# Patient Record
Sex: MALE | Race: OTHER | HISPANIC OR LATINO | URBAN - METROPOLITAN AREA
[De-identification: names, ages, dates, MRNs, and addresses within clinical notes are randomized per-mention and may not be internally consistent; named-entity substitution may affect disease eponyms.]

---

## 2018-03-07 PROBLEM — Z00.00 ENCOUNTER FOR PREVENTIVE HEALTH EXAMINATION: Status: ACTIVE | Noted: 2018-03-07

## 2018-03-27 ENCOUNTER — EMERGENCY (EMERGENCY)
Facility: HOSPITAL | Age: 63
LOS: 1 days | Discharge: HOME | End: 2018-03-27
Attending: STUDENT IN AN ORGANIZED HEALTH CARE EDUCATION/TRAINING PROGRAM
Payer: COMMERCIAL

## 2018-03-27 VITALS
HEIGHT: 63 IN | HEART RATE: 77 BPM | TEMPERATURE: 97 F | DIASTOLIC BLOOD PRESSURE: 91 MMHG | SYSTOLIC BLOOD PRESSURE: 171 MMHG | RESPIRATION RATE: 20 BRPM | WEIGHT: 169.98 LBS | OXYGEN SATURATION: 99 %

## 2018-03-27 PROCEDURE — 99284 EMERGENCY DEPT VISIT MOD MDM: CPT

## 2018-03-27 PROCEDURE — 73060 X-RAY EXAM OF HUMERUS: CPT | Mod: 26,RT

## 2018-03-27 PROCEDURE — 73030 X-RAY EXAM OF SHOULDER: CPT | Mod: 26,RT

## 2018-03-27 PROCEDURE — 72040 X-RAY EXAM NECK SPINE 2-3 VW: CPT | Mod: 26

## 2018-03-27 RX ORDER — PROPOFOL 10 MG/ML
150 INJECTION, EMULSION INTRAVENOUS ONCE
Qty: 0 | Refills: 0 | Status: COMPLETED | OUTPATIENT
Start: 2018-03-27 | End: 2018-03-27

## 2018-03-27 RX ORDER — MORPHINE SULFATE 50 MG/1
5 CAPSULE, EXTENDED RELEASE ORAL ONCE
Qty: 0 | Refills: 0 | Status: DISCONTINUED | OUTPATIENT
Start: 2018-03-27 | End: 2018-03-27

## 2018-03-27 RX ORDER — SODIUM CHLORIDE 9 MG/ML
3 INJECTION INTRAMUSCULAR; INTRAVENOUS; SUBCUTANEOUS EVERY 8 HOURS
Qty: 0 | Refills: 0 | Status: DISCONTINUED | OUTPATIENT
Start: 2018-03-27 | End: 2018-04-01

## 2018-03-27 RX ADMIN — MORPHINE SULFATE 5 MILLIGRAM(S): 50 CAPSULE, EXTENDED RELEASE ORAL at 20:40

## 2018-03-27 RX ADMIN — MORPHINE SULFATE 5 MILLIGRAM(S): 50 CAPSULE, EXTENDED RELEASE ORAL at 20:55

## 2018-03-27 NOTE — ED PROVIDER NOTE - PROGRESS NOTE DETAILS
Local injection given for pain control, attempted ROM, despite significant tolerance, unable to reduce shoulder. Will perform procedural sedation and further manipulation to reduce shoulder

## 2018-03-27 NOTE — ED ADULT TRIAGE NOTE - CHIEF COMPLAINT QUOTE
Pt c/o right shoulder pain s/p slip and fall while at work. Pt reports he slipped on oil and landed on his right side. States fall happened at approx 1pm today, waited to get home to call ambulance. Pt reports + LOC. + deformity present to right shoulder. +radial and brachial pulses. Pt c/o right shoulder pain s/p slip and fall while at work. Pt reports he slipped on oil and landed on his right side. States fall happened at approx 1pm today, waited to get home to call ambulance. Pt reports + LOC. + deformity present to right shoulder. +radial and brachial pulses. Denies blood thinner, head injury or trauma.

## 2018-03-27 NOTE — ED PROVIDER NOTE - OBJECTIVE STATEMENT
63 y/o M pt presents to ED c/o R shoulder pain s/p slip and fall on oil spill that occurred at work at 13:00 today. He also notes nausea and dizziness. Pain is worse with movement. NKDA Denies LOC, head injury, CP, SOB, numbness, tingling, fever, chills, and vomiting. No further complaints at this time.

## 2018-03-27 NOTE — ED PROVIDER NOTE - CONSTITUTIONAL, MLM
normal... Well appearing, well nourished, awake, alert, oriented to person, place, time/situation and in no apparent distress at rest.

## 2018-03-28 VITALS
OXYGEN SATURATION: 100 % | SYSTOLIC BLOOD PRESSURE: 145 MMHG | HEART RATE: 75 BPM | DIASTOLIC BLOOD PRESSURE: 88 MMHG | TEMPERATURE: 99 F | RESPIRATION RATE: 18 BRPM

## 2018-03-28 PROCEDURE — T1013: CPT

## 2018-03-28 PROCEDURE — 23650 CLTX SHO DSLC W/MNPJ WO ANES: CPT | Mod: RT

## 2018-03-28 PROCEDURE — 99285 EMERGENCY DEPT VISIT HI MDM: CPT | Mod: 25

## 2018-03-28 PROCEDURE — 73030 X-RAY EXAM OF SHOULDER: CPT | Mod: 26,RT

## 2018-03-28 PROCEDURE — 73030 X-RAY EXAM OF SHOULDER: CPT

## 2018-03-28 PROCEDURE — 96372 THER/PROPH/DIAG INJ SC/IM: CPT | Mod: XU

## 2018-03-28 PROCEDURE — 96374 THER/PROPH/DIAG INJ IV PUSH: CPT | Mod: XU

## 2018-03-28 PROCEDURE — 73060 X-RAY EXAM OF HUMERUS: CPT

## 2018-03-28 PROCEDURE — 72040 X-RAY EXAM NECK SPINE 2-3 VW: CPT

## 2018-03-28 RX ADMIN — SODIUM CHLORIDE 3 MILLILITER(S): 9 INJECTION INTRAMUSCULAR; INTRAVENOUS; SUBCUTANEOUS at 01:02

## 2018-03-28 RX ADMIN — PROPOFOL 150 MILLIGRAM(S): 10 INJECTION, EMULSION INTRAVENOUS at 00:13

## 2018-03-28 NOTE — ED ADULT NURSE REASSESSMENT NOTE - NS ED NURSE REASSESS COMMENT FT1
Pt. shoulder reduced with orthopedic BRIAN Pagan and Md Gould @ 0016. X-ray to confirm. Total of 70mg Propofol pushed by MD Gould. vital signs remain stable. will continue to monitor and maintain safety.

## 2018-03-28 NOTE — CONSULT NOTE ADULT - SUBJECTIVE AND OBJECTIVE BOX
Pt Name: ELIZABETH GRANT    MRN: [id number]      Patient is a 62y Male presenting to the emergency department with a chief complaint of right shoulder pain   Patient is a 62y old  Male who presents with a chief complaint of right shoulder pain.  Patient states slipped and fell at work around 1 pm landing on shoulder. patient was seen by ER staff found to have right shoulder dislocation which ER was unable to reduce.  Orthopedic consult called    HEALTH ISSUES - PROBLEM Dx: Right shoulder dislocation       .      REVIEW OF SYSTEMS      General:	    Skin/Breast:  	  Ophthalmologic:  	  ENMT:	    Respiratory and Thorax:  	  Cardiovascular:	    Gastrointestinal:	    Genitourinary:	    Musculoskeletal:	 right shoulder pain     Neurological:	    Psychiatric:	    Hematology/Lymphatics:	    Endocrine:	    Allergic/Immunologic:	    ROS is otherwise negative.    PAST MEDICAL & SURGICAL HISTORY:  PAST MEDICAL & SURGICAL HISTORY:      Allergies: No Known Allergies      Medications: propofol Injectable 150 milliGRAM(s) IV Push once  sodium chloride 0.9% lock flush 3 milliLiter(s) IV Push every 8 hours      FAMILY HISTORY:  : non-contributory    Social History: denies drug use     Ambulation: Walking independently              PHYSICAL EXAM:    Vital Signs Last 24 Hrs  T(C): 37.2 (27 Mar 2018 23:41), Max: 37.2 (27 Mar 2018 23:41)  T(F): 98.9 (27 Mar 2018 23:41), Max: 98.9 (27 Mar 2018 23:41)  HR: 78 (27 Mar 2018 23:41) (77 - 78)  BP: 136/78 (27 Mar 2018 23:41) (136/78 - 171/91)  BP(mean): --  RR: 18 (27 Mar 2018 23:41) (18 - 20)  SpO2: 97% (27 Mar 2018 23:41) (97% - 99%)  Daily Height in cm: 160.02 (27 Mar 2018 18:02)    Daily     Appearance: Alert, responsive, in no acute distress.    Neurological: Sensation is grossly intact to light touch. 5/5 motor function of all extremities. No focal deficits or weaknesses found.    Skin: no rash on visible skin. Skin is clean, dry and intact. No bleeding. No abrasions. No ulcerations.    Vascular: 2+ distal pulses. Cap refill < 2 sec. No signs of venous insuffiency or stasis. No extremity ulcerations. No cyanosis.    Musculoskeletal:         Right Upper Extremity: positive deformity to right shoulder.  positive pain on passive ROM, unable to actively range due to pain. sensation to arm intact to light touch, pulse intact     Imaging Studies: right shoulder xray - positive dislocation     JOINT REDUCTION  PROCEDURE NOTE: Joint reduction     Performed by: Jordon Pagan PA-C    Indication: Dislocation of Right shoulder dislocation     Consent: The risks and benefits of the procedure including incomplete reduction, secondary fracture, nerve damage and bleeding were explained and the patient verbalized their understanding and wished to proceed with the procedure. Written consent was obtained following the discussion.    Universal Protocol: a time out was performed and the correct patient and site were verified     Procedure: Neurovascular exam intact prior to joint reduction.  Skin exam: no bleeding or lacerations at the fracture site. Conscious sedation performed by emergency department attending physician. Anesthesia was administered using sedation by ER attending Reduction of the Right Shoulder Dislcoation was accomplished via traction.  The joint was immobilized with sling. Distally, the extremity was neurovascular intact following the procedure.  The patient tolerated the procedure well.    Post reduction films obtained and demonstrated an adequate reduction.    Complications: None      Case discussed with Dr. Escobar    A/P:  Pt is a  62y old  Male who presents with a chief complaint of right shoulder pain s/p fall found to have right shoulder dislocation     PLAN:   1. NWB RUE   2. Keep sling in place  3. Follow up in office this week

## 2018-03-28 NOTE — ED ADULT NURSE NOTE - CHIEF COMPLAINT QUOTE
Pt c/o right shoulder pain s/p slip and fall while at work. Pt reports he slipped on oil and landed on his right side. States fall happened at approx 1pm today, waited to get home to call ambulance. Pt reports + LOC. + deformity present to right shoulder. +radial and brachial pulses. Denies blood thinner, head injury or trauma.

## 2018-03-28 NOTE — ED ADULT NURSE NOTE - OBJECTIVE STATEMENT
Pt. presents to ED s/p slip and fall at work. Pt. denies hitting head or LOC, c/o right shoulder pain with slight deformity noted. Pt. has full sensation to right upper extremity. no other complaints at this time.

## 2018-03-28 NOTE — ED ADULT NURSE REASSESSMENT NOTE - NS ED NURSE REASSESS COMMENT FT1
Time out performed for conscious sedation and right shoulder seduction. Py. VSS, necessary equipment at bedside.

## 2018-03-28 NOTE — ED PROCEDURE NOTE - NS_POSTPROCCAREGUIDE_ED_ALL_ED
Patient is now fully awake, with vital signs and temperature stable, hydration is adequate, patients Justus’s  score is at baseline (or greater than 8), patient and escort has received  discharge education.

## 2018-04-05 ENCOUNTER — APPOINTMENT (OUTPATIENT)
Dept: GASTROENTEROLOGY | Facility: CLINIC | Age: 63
End: 2018-04-05

## 2021-01-05 ENCOUNTER — EMERGENCY (EMERGENCY)
Facility: HOSPITAL | Age: 66
LOS: 1 days | Discharge: HOME | End: 2021-01-05
Attending: EMERGENCY MEDICINE
Payer: MEDICARE

## 2021-01-05 VITALS
RESPIRATION RATE: 18 BRPM | HEIGHT: 63 IN | SYSTOLIC BLOOD PRESSURE: 139 MMHG | DIASTOLIC BLOOD PRESSURE: 85 MMHG | HEART RATE: 64 BPM | OXYGEN SATURATION: 99 % | TEMPERATURE: 99 F | WEIGHT: 179.9 LBS

## 2021-01-05 PROCEDURE — 99053 MED SERV 10PM-8AM 24 HR FAC: CPT

## 2021-01-05 PROCEDURE — 99285 EMERGENCY DEPT VISIT HI MDM: CPT

## 2021-01-06 LAB
ALBUMIN SERPL ELPH-MCNC: 4.2 G/DL — SIGNIFICANT CHANGE UP (ref 3.3–5.2)
ALP SERPL-CCNC: 108 U/L — SIGNIFICANT CHANGE UP (ref 40–120)
ALT FLD-CCNC: 14 U/L — SIGNIFICANT CHANGE UP
ANION GAP SERPL CALC-SCNC: 11 MMOL/L — SIGNIFICANT CHANGE UP (ref 5–17)
APPEARANCE UR: CLEAR — SIGNIFICANT CHANGE UP
APTT BLD: 34.2 SEC — SIGNIFICANT CHANGE UP (ref 27.5–35.5)
AST SERPL-CCNC: 17 U/L — SIGNIFICANT CHANGE UP
BACTERIA # UR AUTO: ABNORMAL
BASOPHILS # BLD AUTO: 0.04 K/UL — SIGNIFICANT CHANGE UP (ref 0–0.2)
BASOPHILS NFR BLD AUTO: 0.4 % — SIGNIFICANT CHANGE UP (ref 0–2)
BILIRUB SERPL-MCNC: 0.2 MG/DL — LOW (ref 0.4–2)
BILIRUB UR-MCNC: NEGATIVE — SIGNIFICANT CHANGE UP
BLD GP AB SCN SERPL QL: SIGNIFICANT CHANGE UP
BUN SERPL-MCNC: 16 MG/DL — SIGNIFICANT CHANGE UP (ref 8–20)
CALCIUM SERPL-MCNC: 9.3 MG/DL — SIGNIFICANT CHANGE UP (ref 8.6–10.2)
CHLORIDE SERPL-SCNC: 104 MMOL/L — SIGNIFICANT CHANGE UP (ref 98–107)
CO2 SERPL-SCNC: 24 MMOL/L — SIGNIFICANT CHANGE UP (ref 22–29)
COLOR SPEC: YELLOW — SIGNIFICANT CHANGE UP
COMMENT - URINE: SIGNIFICANT CHANGE UP
CREAT SERPL-MCNC: 0.74 MG/DL — SIGNIFICANT CHANGE UP (ref 0.5–1.3)
DIFF PNL FLD: ABNORMAL
EOSINOPHIL # BLD AUTO: 0.11 K/UL — SIGNIFICANT CHANGE UP (ref 0–0.5)
EOSINOPHIL NFR BLD AUTO: 1.1 % — SIGNIFICANT CHANGE UP (ref 0–6)
EPI CELLS # UR: SIGNIFICANT CHANGE UP
GLUCOSE SERPL-MCNC: 101 MG/DL — HIGH (ref 70–99)
GLUCOSE UR QL: NEGATIVE MG/DL — SIGNIFICANT CHANGE UP
HCT VFR BLD CALC: 43.1 % — SIGNIFICANT CHANGE UP (ref 39–50)
HGB BLD-MCNC: 14.4 G/DL — SIGNIFICANT CHANGE UP (ref 13–17)
IMM GRANULOCYTES NFR BLD AUTO: 0.5 % — SIGNIFICANT CHANGE UP (ref 0–1.5)
INR BLD: 1.15 RATIO — SIGNIFICANT CHANGE UP (ref 0.88–1.16)
KETONES UR-MCNC: NEGATIVE — SIGNIFICANT CHANGE UP
LACTATE BLDV-MCNC: 1 MMOL/L — SIGNIFICANT CHANGE UP (ref 0.5–2)
LEUKOCYTE ESTERASE UR-ACNC: NEGATIVE — SIGNIFICANT CHANGE UP
LYMPHOCYTES # BLD AUTO: 2.79 K/UL — SIGNIFICANT CHANGE UP (ref 1–3.3)
LYMPHOCYTES # BLD AUTO: 28.9 % — SIGNIFICANT CHANGE UP (ref 13–44)
MCHC RBC-ENTMCNC: 32 PG — SIGNIFICANT CHANGE UP (ref 27–34)
MCHC RBC-ENTMCNC: 33.4 GM/DL — SIGNIFICANT CHANGE UP (ref 32–36)
MCV RBC AUTO: 95.8 FL — SIGNIFICANT CHANGE UP (ref 80–100)
MONOCYTES # BLD AUTO: 0.86 K/UL — SIGNIFICANT CHANGE UP (ref 0–0.9)
MONOCYTES NFR BLD AUTO: 8.9 % — SIGNIFICANT CHANGE UP (ref 2–14)
NEUTROPHILS # BLD AUTO: 5.79 K/UL — SIGNIFICANT CHANGE UP (ref 1.8–7.4)
NEUTROPHILS NFR BLD AUTO: 60.2 % — SIGNIFICANT CHANGE UP (ref 43–77)
NITRITE UR-MCNC: NEGATIVE — SIGNIFICANT CHANGE UP
PH UR: 6 — SIGNIFICANT CHANGE UP (ref 5–8)
PLATELET # BLD AUTO: 232 K/UL — SIGNIFICANT CHANGE UP (ref 150–400)
POTASSIUM SERPL-MCNC: 4.3 MMOL/L — SIGNIFICANT CHANGE UP (ref 3.5–5.3)
POTASSIUM SERPL-SCNC: 4.3 MMOL/L — SIGNIFICANT CHANGE UP (ref 3.5–5.3)
PROT SERPL-MCNC: 7.1 G/DL — SIGNIFICANT CHANGE UP (ref 6.6–8.7)
PROT UR-MCNC: 15 MG/DL
PROTHROM AB SERPL-ACNC: 13.2 SEC — SIGNIFICANT CHANGE UP (ref 10.6–13.6)
RBC # BLD: 4.5 M/UL — SIGNIFICANT CHANGE UP (ref 4.2–5.8)
RBC # FLD: 12.9 % — SIGNIFICANT CHANGE UP (ref 10.3–14.5)
RBC CASTS # UR COMP ASSIST: SIGNIFICANT CHANGE UP /HPF (ref 0–4)
SODIUM SERPL-SCNC: 138 MMOL/L — SIGNIFICANT CHANGE UP (ref 135–145)
SP GR SPEC: 1.02 — SIGNIFICANT CHANGE UP (ref 1.01–1.02)
UROBILINOGEN FLD QL: NEGATIVE MG/DL — SIGNIFICANT CHANGE UP
WBC # BLD: 9.64 K/UL — SIGNIFICANT CHANGE UP (ref 3.8–10.5)
WBC # FLD AUTO: 9.64 K/UL — SIGNIFICANT CHANGE UP (ref 3.8–10.5)
WBC UR QL: SIGNIFICANT CHANGE UP

## 2021-01-06 PROCEDURE — 85025 COMPLETE CBC W/AUTO DIFF WBC: CPT

## 2021-01-06 PROCEDURE — 36415 COLL VENOUS BLD VENIPUNCTURE: CPT

## 2021-01-06 PROCEDURE — 99284 EMERGENCY DEPT VISIT MOD MDM: CPT | Mod: 25

## 2021-01-06 PROCEDURE — 85730 THROMBOPLASTIN TIME PARTIAL: CPT

## 2021-01-06 PROCEDURE — 74177 CT ABD & PELVIS W/CONTRAST: CPT | Mod: 26

## 2021-01-06 PROCEDURE — 86900 BLOOD TYPING SEROLOGIC ABO: CPT

## 2021-01-06 PROCEDURE — 85610 PROTHROMBIN TIME: CPT

## 2021-01-06 PROCEDURE — 86850 RBC ANTIBODY SCREEN: CPT

## 2021-01-06 PROCEDURE — 80053 COMPREHEN METABOLIC PANEL: CPT

## 2021-01-06 PROCEDURE — 81001 URINALYSIS AUTO W/SCOPE: CPT

## 2021-01-06 PROCEDURE — 96374 THER/PROPH/DIAG INJ IV PUSH: CPT | Mod: XU

## 2021-01-06 PROCEDURE — 86901 BLOOD TYPING SEROLOGIC RH(D): CPT

## 2021-01-06 PROCEDURE — 74177 CT ABD & PELVIS W/CONTRAST: CPT

## 2021-01-06 PROCEDURE — 83605 ASSAY OF LACTIC ACID: CPT

## 2021-01-06 RX ORDER — MORPHINE SULFATE 50 MG/1
6 CAPSULE, EXTENDED RELEASE ORAL ONCE
Refills: 0 | Status: DISCONTINUED | OUTPATIENT
Start: 2021-01-06 | End: 2021-01-06

## 2021-01-06 RX ADMIN — MORPHINE SULFATE 6 MILLIGRAM(S): 50 CAPSULE, EXTENDED RELEASE ORAL at 03:55

## 2021-01-06 NOTE — ED PROVIDER NOTE - PROGRESS NOTE DETAILS
surgery consulted: were able to reduce the hernia. recommend the patient may be d/c and follow up with surgery outpatient.

## 2021-01-06 NOTE — CONSULT NOTE ADULT - ATTENDING COMMENTS
Pt with reducible inguinal hernia with relief of discomfort with reduction  Pt to follow up in office for planning for elective hernia repair   Case discussed with on-call surgery resident

## 2021-01-06 NOTE — ED PROVIDER NOTE - OBJECTIVE STATEMENT
65 year old male with no pmhx presents with c/o hernia. Pt states he has had this hernia for 2 years, he has never seen a doctor for it previously. He states in the past the hernia migrates into the scrotum during BM's, urination and increased pressure but freely reduces after. For the last week he states it is non-reducible and he has increased pain. He has not been taking medications for the pain. He denies n/v/d, constipation, loss of appetite, fever/chills.

## 2021-01-06 NOTE — ED PROVIDER NOTE - ATTENDING CONTRIBUTION TO CARE
I personally saw the patient with the PA, and completed the key components of the history and physical exam. I then discussed the management plan with the PA.   gen in mild pain resp clear cardiac no mrumur abd soft nt gu + right inguinal ttp hernia sac non reducible no left testicle swelling   surgery recs implemented

## 2021-01-06 NOTE — ED PROVIDER NOTE - NSFOLLOWUPINSTRUCTIONS_ED_ALL_ED_FT
Follow up with Surgery within the next 2 days   Take Tylenol as needed for pain    Return to ER if symptoms return, persists, intractable pain, n/v, fever/chills.       Hernia    A hernia is when fat or the intestines push through a weak area in the abdominal wall. This can occur around the belly button (umbilical hernia) or where the leg meets the lower abdomen (inguinal hernia). This creates a rounded lump (bulge). Hernias that can be pushed back into the belly are called reducible and those that cannot are called incarcerated. Hernias that are not reducible and lose their blood supply are called strangulated and require emergency surgery. Hernias can be caused or worsened when straining during bowel movements or lifting heavy objects as well as coughing or sneezing. Surgery may be helpful in treating this condition so follow up with a surgeon.    SEEK IMMEDIATE MEDICAL CARE IF YOU HAVE ANY OF THE FOLLOWING SYMPTOMS: worsening abdominal pain, change in color over the hernia, nausea/vomiting, or inability to have a bowel movement or pass gas.

## 2021-01-06 NOTE — ED PROVIDER NOTE - NSFOLLOWUPCLINICS_GEN_ALL_ED_FT
[facility] General Surgery  Surgery  [address] [place] [address]  Phone: [phone]  Fax:   Follow Up Time:

## 2021-01-06 NOTE — CONSULT NOTE ADULT - SUBJECTIVE AND OBJECTIVE BOX
ACUTE CARE SURGERY CONSULT    HPI: Mr. Omer is a 65M no known PMH, presenting with 1-day of constant righ groin pain. Patient has a know history of a right inguinal hernia and describes similar pain 2 years ago that resolved with OTC pain medications. No previous evaluations in the past. He states that the pain is associated with difficulty urinating. Denies changes in bowel habits or food intolerance. Last BM yesterday was normal. Last meal wss 5pm yesterday. No nausea or vomiting. Denies skin changes but endorses increased swelling of the scrotum. No fevers, CP, SOB.       PAST MEDICAL HISTORY:  No pertinent past medical history        PAST SURGICAL HISTORY:  No significant past surgical history        ALLERGIES:  No Known Allergies      FAMILY HISTORY: Noncontributory    SOCIAL HISTORY: Denies tobacco, EtOH, illicit substance use.     HOME MEDICATIONS:    MEDICATIONS  (STANDING):  morphine  - Injectable 6 milliGRAM(s) IV Push Once    MEDICATIONS  (PRN):      VITALS & I/Os:  Vital Signs Last 24 Hrs  T(C): 37.3 (2021 23:41), Max: 37.3 (2021 23:41)  T(F): 99.2 (2021 23:41), Max: 99.2 (2021 23:41)  HR: 64 (2021 23:41) (64 - 64)  BP: 139/85 (2021 23:41) (139/85 - 139/85)  BP(mean): --  RR: 18 (2021 23:41) (18 - 18)  SpO2: 99% (2021 23:41) (99% - 99%)  CAPILLARY BLOOD GLUCOSE          I&O's Summary      GENERAL: Alert, well developed, in no acute distress.  MENTAL STATUS: AAOx3. Appropriate affect.  RESPIRATORY: CTAB. No wheezing, rales or rhonchi.  CARDIOVASCULAR: RRR. No audible murmurs, rubs or gallops.   GASTROINTESTINAL: Abdomen soft, NT, ND, -R/-G,   GROIN: Palpable approx. 2cm defect in right inguinal region, unable to fully reduce hernia contents     LABS:                        14.4   9.64  )-----------( 232      ( 2021 01:13 )             43.1     01-06    138  |  104  |  16.0  ----------------------------<  101<H>  4.3   |  24.0  |  0.74    Ca    9.3      2021 01:13    TPro  7.1  /  Alb  4.2  /  TBili  0.2<L>  /  DBili  x   /  AST  17  /  ALT  14  /  AlkPhos  108      Lactate: morphine  - Injectable 6 milliGRAM(s) IV Push Once     PT/INR - ( 2021 01:13 )   PT: 13.2 sec;   INR: 1.15 ratio         PTT - ( 2021 01:13 )  PTT:34.2 sec         @ 01:11  1.0    Urinalysis Basic - ( 2021 01:22 )    Color: Yellow / Appearance: Clear / S.020 / pH: x  Gluc: x / Ketone: Negative  / Bili: Negative / Urobili: Negative mg/dL   Blood: x / Protein: 15 mg/dL / Nitrite: Negative   Leuk Esterase: Negative / RBC: 0-2 /HPF / WBC 0-2   Sq Epi: x / Non Sq Epi: Occasional / Bacteria: Occasional        IMAGING:  CT A/P: Mildly prominent fecalized distal jejunal and ileal loops, concerning for ileus vs at least partial small bowel obstruction.  Transition point suspected at the level of the right groin hernia containing portion of the distal jejunal fold with trace mesenteric haziness. Recommend clinical correlation to assess for strangulated or incarcerated hernia though no pneumatosis or portal venous gas identified. Correlate with lactic acid.    Portion of the bladder also herniates within the right groin hernia extending tothe scrotal sac.   ACUTE CARE SURGERY CONSULT    HPI: Mr. Omer is a 65M no known PMH, presenting with 1-day of constant righ groin pain. Patient has a know history of a right inguinal hernia and describes similar pain 2 years ago that resolved with OTC pain medications. No previous evaluations in the past. He states that the pain is associated with difficulty urinating. Denies changes in bowel habits or food intolerance. Last BM yesterday was normal. Last meal wss 5pm yesterday. No nausea or vomiting. Denies skin changes but endorses increased swelling of the scrotum. No fevers, CP, SOB.       PAST MEDICAL HISTORY:  No pertinent past medical history        PAST SURGICAL HISTORY:  No significant past surgical history        ALLERGIES:  No Known Allergies      FAMILY HISTORY: Noncontributory    SOCIAL HISTORY: Denies tobacco, EtOH, illicit substance use.     HOME MEDICATIONS:    MEDICATIONS  (STANDING):  morphine  - Injectable 6 milliGRAM(s) IV Push Once    MEDICATIONS  (PRN):      VITALS & I/Os:  Vital Signs Last 24 Hrs  T(C): 37.3 (2021 23:41), Max: 37.3 (2021 23:41)  T(F): 99.2 (2021 23:41), Max: 99.2 (2021 23:41)  HR: 64 (2021 23:41) (64 - 64)  BP: 139/85 (2021 23:41) (139/85 - 139/85)  BP(mean): --  RR: 18 (2021 23:41) (18 - 18)  SpO2: 99% (2021 23:41) (99% - 99%)  CAPILLARY BLOOD GLUCOSE          I&O's Summary      GENERAL: Alert, well developed, in no acute distress.  MENTAL STATUS: AAOx3. Appropriate affect.  RESPIRATORY: CTAB. No wheezing, rales or rhonchi.  CARDIOVASCULAR: RRR. No audible murmurs, rubs or gallops.   GASTROINTESTINAL: Abdomen soft, NT, ND, -R/-G,   GROIN: Palpable approx. 2cm defect in right inguinal region, unable to fully reduce hernia contents, no overlying scrotal or inguinal skin changes      LABS:                        14.4   9.64  )-----------( 232      ( 2021 01:13 )             43.1     01-06    138  |  104  |  16.0  ----------------------------<  101<H>  4.3   |  24.0  |  0.74    Ca    9.3      2021 01:13    TPro  7.1  /  Alb  4.2  /  TBili  0.2<L>  /  DBili  x   /  AST  17  /  ALT  14  /  AlkPhos  108      Lactate: morphine  - Injectable 6 milliGRAM(s) IV Push Once     PT/INR - ( 2021 01:13 )   PT: 13.2 sec;   INR: 1.15 ratio         PTT - ( 2021 01:13 )  PTT:34.2 sec         @ 01:11  1.0    Urinalysis Basic - ( 2021 01:22 )    Color: Yellow / Appearance: Clear / S.020 / pH: x  Gluc: x / Ketone: Negative  / Bili: Negative / Urobili: Negative mg/dL   Blood: x / Protein: 15 mg/dL / Nitrite: Negative   Leuk Esterase: Negative / RBC: 0-2 /HPF / WBC 0-2   Sq Epi: x / Non Sq Epi: Occasional / Bacteria: Occasional        IMAGING:  CT A/P: Mildly prominent fecalized distal jejunal and ileal loops, concerning for ileus vs at least partial small bowel obstruction.  Transition point suspected at the level of the right groin hernia containing portion of the distal jejunal fold with trace mesenteric haziness. Recommend clinical correlation to assess for strangulated or incarcerated hernia though no pneumatosis or portal venous gas identified. Correlate with lactic acid.    Portion of the bladder also herniates within the right groin hernia extending tothe scrotal sac.   ACUTE CARE SURGERY CONSULT    HPI: Mr. Omer is a 65M no known PMH, presenting with 1-day of constant righ groin pain. Patient has a know history of a right inguinal hernia and describes similar pain 2 years ago that resolved with OTC pain medications. No previous evaluations in the past. He states that the pain is associated with difficulty urinating. Denies changes in bowel habits or food intolerance. Last BM yesterday was normal. Last meal wss 5pm yesterday. No nausea or vomiting. Denies skin changes but endorses increased swelling of the scrotum. No fevers, CP, SOB.       PAST MEDICAL HISTORY:  No pertinent past medical history        PAST SURGICAL HISTORY:  No significant past surgical history        ALLERGIES:  No Known Allergies      FAMILY HISTORY: Noncontributory    SOCIAL HISTORY: Denies tobacco, EtOH, illicit substance use.     HOME MEDICATIONS:    MEDICATIONS  (STANDING):  morphine  - Injectable 6 milliGRAM(s) IV Push Once    MEDICATIONS  (PRN):      VITALS & I/Os:  Vital Signs Last 24 Hrs  T(C): 37.3 (2021 23:41), Max: 37.3 (2021 23:41)  T(F): 99.2 (2021 23:41), Max: 99.2 (2021 23:41)  HR: 64 (2021 23:41) (64 - 64)  BP: 139/85 (2021 23:41) (139/85 - 139/85)  BP(mean): --  RR: 18 (2021 23:41) (18 - 18)  SpO2: 99% (2021 23:41) (99% - 99%)  CAPILLARY BLOOD GLUCOSE          I&O's Summary      GENERAL: Alert, well developed, in no acute distress.  MENTAL STATUS: AAOx3. Appropriate affect.  RESPIRATORY: CTAB. No wheezing, rales or rhonchi.  CARDIOVASCULAR: RRR. No audible murmurs, rubs or gallops.   GASTROINTESTINAL: Abdomen soft, NT, ND, -R/-G,   GROIN: Palpable large right inguinal defect, fully reducible soft hernia contents, no overlying scrotal or inguinal skin changes      LABS:                        14.4   9.64  )-----------( 232      ( 2021 01:13 )             43.1     01-06    138  |  104  |  16.0  ----------------------------<  101<H>  4.3   |  24.0  |  0.74    Ca    9.3      2021 01:13    TPro  7.1  /  Alb  4.2  /  TBili  0.2<L>  /  DBili  x   /  AST  17  /  ALT  14  /  AlkPhos  108      Lactate: morphine  - Injectable 6 milliGRAM(s) IV Push Once     PT/INR - ( 2021 01:13 )   PT: 13.2 sec;   INR: 1.15 ratio         PTT - ( 2021 01:13 )  PTT:34.2 sec         @ 01:11  1.0    Urinalysis Basic - ( 2021 01:22 )    Color: Yellow / Appearance: Clear / S.020 / pH: x  Gluc: x / Ketone: Negative  / Bili: Negative / Urobili: Negative mg/dL   Blood: x / Protein: 15 mg/dL / Nitrite: Negative   Leuk Esterase: Negative / RBC: 0-2 /HPF / WBC 0-2   Sq Epi: x / Non Sq Epi: Occasional / Bacteria: Occasional        IMAGING:  CT A/P: Mildly prominent fecalized distal jejunal and ileal loops, concerning for ileus vs at least partial small bowel obstruction.  Transition point suspected at the level of the right groin hernia containing portion of the distal jejunal fold with trace mesenteric haziness. Recommend clinical correlation to assess for strangulated or incarcerated hernia though no pneumatosis or portal venous gas identified. Correlate with lactic acid.    Portion of the bladder also herniates within the right groin hernia extending tothe scrotal sac.

## 2021-01-06 NOTE — ED ADULT NURSE NOTE - OBJECTIVE STATEMENT
pt arrived c/o generalized abdominal pain. states its from his hernia but worse lately. denies nausea, vomiting, diarrhea. pt aao x3. respirations even and unlabored. skin color wnl warm and dry. no distress noted.

## 2021-01-06 NOTE — CONSULT NOTE ADULT - ASSESSMENT
65M, known history of a right inguinal hernia, presenting with an incarcerated right inguinal hernia containing a portion of the bladder and loops of jejunum. Lactate 1.0. No signs of bowel ischemia. AVSS.    -Maintain NPO/IVF  -Pain control   -Will discuss with attending  65M, presenting with a right inguinal hernia. Fully reducible at bedside. No evidence of strangulation or clinical concern for obstruction.   Lactate 1.0. AVSS.    -Pain control  -Avoid heavy lifting of greater than 10-15lbs   -Patient advised to return to ED if febrile, scrotal skin changes, nausea, vomiting, or food intolerance   -Patient can follow-up in [facility] clinic in 2-weeks to discuss elective repair of right inguinal hernia

## 2021-01-06 NOTE — ED PROVIDER NOTE - PATIENT PORTAL LINK FT
You can access the FollowMyHealth Patient Portal offered by [facility] by registering at the following website: [email or web address]. By joining [facility]’s FollowMyHealth portal, you will also be able to view your health information using other applications (apps) compatible with our system.

## 2021-01-08 ENCOUNTER — TRANSCRIPTION ENCOUNTER (OUTPATIENT)
Age: 66
End: 2021-01-08

## 2021-01-08 ENCOUNTER — INPATIENT (INPATIENT)
Facility: HOSPITAL | Age: 66
LOS: 0 days | Discharge: HOME | DRG: 352 | End: 2021-01-09
Attending: SURGERY | Admitting: SURGERY
Payer: MEDICARE

## 2021-01-08 VITALS
RESPIRATION RATE: 18 BRPM | TEMPERATURE: 98 F | DIASTOLIC BLOOD PRESSURE: 82 MMHG | OXYGEN SATURATION: 99 % | HEART RATE: 70 BPM | SYSTOLIC BLOOD PRESSURE: 157 MMHG | WEIGHT: 164.91 LBS | HEIGHT: 63 IN

## 2021-01-08 LAB
BASOPHILS # BLD AUTO: 0.05 K/UL — SIGNIFICANT CHANGE UP (ref 0–0.2)
BASOPHILS NFR BLD AUTO: 0.5 % — SIGNIFICANT CHANGE UP (ref 0–2)
EOSINOPHIL # BLD AUTO: 0.11 K/UL — SIGNIFICANT CHANGE UP (ref 0–0.5)
EOSINOPHIL NFR BLD AUTO: 1.1 % — SIGNIFICANT CHANGE UP (ref 0–6)
HCT VFR BLD CALC: 44.1 % — SIGNIFICANT CHANGE UP (ref 39–50)
HGB BLD-MCNC: 14.8 G/DL — SIGNIFICANT CHANGE UP (ref 13–17)
IMM GRANULOCYTES NFR BLD AUTO: 0.4 % — SIGNIFICANT CHANGE UP (ref 0–1.5)
LACTATE BLDV-MCNC: 1.1 MMOL/L — SIGNIFICANT CHANGE UP (ref 0.5–2)
LYMPHOCYTES # BLD AUTO: 4.5 K/UL — HIGH (ref 1–3.3)
LYMPHOCYTES # BLD AUTO: 43.3 % — SIGNIFICANT CHANGE UP (ref 13–44)
MCHC RBC-ENTMCNC: 32 PG — SIGNIFICANT CHANGE UP (ref 27–34)
MCHC RBC-ENTMCNC: 33.6 GM/DL — SIGNIFICANT CHANGE UP (ref 32–36)
MCV RBC AUTO: 95.5 FL — SIGNIFICANT CHANGE UP (ref 80–100)
MONOCYTES # BLD AUTO: 0.93 K/UL — HIGH (ref 0–0.9)
MONOCYTES NFR BLD AUTO: 9 % — SIGNIFICANT CHANGE UP (ref 2–14)
NEUTROPHILS # BLD AUTO: 4.76 K/UL — SIGNIFICANT CHANGE UP (ref 1.8–7.4)
NEUTROPHILS NFR BLD AUTO: 45.7 % — SIGNIFICANT CHANGE UP (ref 43–77)
PLATELET # BLD AUTO: 232 K/UL — SIGNIFICANT CHANGE UP (ref 150–400)
RBC # BLD: 4.62 M/UL — SIGNIFICANT CHANGE UP (ref 4.2–5.8)
RBC # FLD: 12.6 % — SIGNIFICANT CHANGE UP (ref 10.3–14.5)
WBC # BLD: 10.39 K/UL — SIGNIFICANT CHANGE UP (ref 3.8–10.5)
WBC # FLD AUTO: 10.39 K/UL — SIGNIFICANT CHANGE UP (ref 3.8–10.5)

## 2021-01-08 PROCEDURE — 99053 MED SERV 10PM-8AM 24 HR FAC: CPT

## 2021-01-08 PROCEDURE — 99285 EMERGENCY DEPT VISIT HI MDM: CPT

## 2021-01-08 RX ORDER — MORPHINE SULFATE 50 MG/1
4 CAPSULE, EXTENDED RELEASE ORAL ONCE
Refills: 0 | Status: DISCONTINUED | OUTPATIENT
Start: 2021-01-08 | End: 2021-01-08

## 2021-01-08 RX ADMIN — MORPHINE SULFATE 4 MILLIGRAM(S): 50 CAPSULE, EXTENDED RELEASE ORAL at 23:44

## 2021-01-08 NOTE — ED PROVIDER NOTE - PHYSICAL EXAMINATION
Gen: WD/WN, uncomfortable appearing  Head: NCAT  Cardiac: RRR +S1S2.   Resp: Speaking in full sentences. No evidence of respiratory distress. No wheezes, rales or rhonchi.  Abd: +BS x 4. Soft, non-tender, non-distended. No guarding or rebound.  : +Very large palpable right inguinal hernia, no overlying skin changes.  Back: Spine midline and non-tender. No CVAT.  Skin: Warm, dry, no rashes or lesions  Neuro: Awake, alert & oriented x 3. No focal deficits, ambulatory with steady gait     Chaperone ED  Luis Reyes

## 2021-01-08 NOTE — ED PROVIDER NOTE - ATTENDING CONTRIBUTION TO CARE
66 yo M with recurrent large R inguinal hernia.  Pt seen in ED 4 days ago and hernia was reduced and pt was subsequently dc'd.  No assoc fever ot vomiting.  On exam pt awake and alert, appears uncomfortable, + large nonreducible inguinal hernia.  Pt seen and evaluated by Surgery and pt to be admitted to s/o Dr. Almaguer

## 2021-01-08 NOTE — ED ADULT NURSE NOTE - NSIMPLEMENTINTERV_GEN_ALL_ED
Implemented All Universal Safety Interventions:  [place] to call system. Call bell, personal items and telephone within reach. Instruct patient to call for assistance. Room bathroom lighting operational. Non-slip footwear when patient is off stretcher. Physically safe environment: no spills, clutter or unnecessary equipment. Stretcher in lowest position, wheels locked, appropriate side rails in place.

## 2021-01-08 NOTE — ED PROVIDER NOTE - CLINICAL SUMMARY MEDICAL DECISION MAKING FREE TEXT BOX
66yo M with large right inguinal hernia. No fever, no overlying skin changes. Labs, pain meds, surgery consult

## 2021-01-08 NOTE — ED PROVIDER NOTE - OBJECTIVE STATEMENT
66yo M pmhx inguinal hernia presents to ED c/o hernia. Pt was seen in ED 3 days ago for right inguinal hernia, surgery service was able to reduce and pt was discharged to f/u as outpt. States area remained "okay" for 1 day but has since become worse, states hernia is protruding again and worse than it was 3 days ago. Noting pain to area and pressure while trying to urinate. No burning with urination. Denies fever, skin changes, erythema, dysuria, n/v.

## 2021-01-08 NOTE — ED ADULT NURSE NOTE - OBJECTIVE STATEMENT
Pt presents to ED for inguinal hernia, was here several days ago with a hernia which was reduced but has become worse since Tuesday. Pt in obvious discomfort and hernia is visible even with jeans on. IV access initiated, labs drawn and pt medicated as ordered. Pt pending surgical consult, will continue to monitor.

## 2021-01-09 ENCOUNTER — TRANSCRIPTION ENCOUNTER (OUTPATIENT)
Age: 66
End: 2021-01-09

## 2021-01-09 VITALS
SYSTOLIC BLOOD PRESSURE: 123 MMHG | DIASTOLIC BLOOD PRESSURE: 80 MMHG | HEART RATE: 83 BPM | TEMPERATURE: 98 F | RESPIRATION RATE: 18 BRPM | OXYGEN SATURATION: 97 %

## 2021-01-09 DIAGNOSIS — K40.90 UNILATERAL INGUINAL HERNIA, WITHOUT OBSTRUCTION OR GANGRENE, NOT SPECIFIED AS RECURRENT: ICD-10-CM

## 2021-01-09 LAB
ALBUMIN SERPL ELPH-MCNC: 4.4 G/DL — SIGNIFICANT CHANGE UP (ref 3.3–5.2)
ALP SERPL-CCNC: 124 U/L — HIGH (ref 40–120)
ALT FLD-CCNC: 16 U/L — SIGNIFICANT CHANGE UP
ANION GAP SERPL CALC-SCNC: 11 MMOL/L — SIGNIFICANT CHANGE UP (ref 5–17)
APTT BLD: 31.5 SEC — SIGNIFICANT CHANGE UP (ref 27.5–35.5)
AST SERPL-CCNC: 22 U/L — SIGNIFICANT CHANGE UP
BILIRUB SERPL-MCNC: 0.2 MG/DL — LOW (ref 0.4–2)
BLD GP AB SCN SERPL QL: SIGNIFICANT CHANGE UP
BUN SERPL-MCNC: 12 MG/DL — SIGNIFICANT CHANGE UP (ref 8–20)
CALCIUM SERPL-MCNC: 9.4 MG/DL — SIGNIFICANT CHANGE UP (ref 8.6–10.2)
CHLORIDE SERPL-SCNC: 103 MMOL/L — SIGNIFICANT CHANGE UP (ref 98–107)
CO2 SERPL-SCNC: 23 MMOL/L — SIGNIFICANT CHANGE UP (ref 22–29)
CREAT SERPL-MCNC: 0.54 MG/DL — SIGNIFICANT CHANGE UP (ref 0.5–1.3)
GLUCOSE BLDC GLUCOMTR-MCNC: 82 MG/DL — SIGNIFICANT CHANGE UP (ref 70–99)
GLUCOSE SERPL-MCNC: 104 MG/DL — HIGH (ref 70–99)
INR BLD: 1.14 RATIO — SIGNIFICANT CHANGE UP (ref 0.88–1.16)
POTASSIUM SERPL-MCNC: 4.6 MMOL/L — SIGNIFICANT CHANGE UP (ref 3.5–5.3)
POTASSIUM SERPL-SCNC: 4.6 MMOL/L — SIGNIFICANT CHANGE UP (ref 3.5–5.3)
PROT SERPL-MCNC: 7.6 G/DL — SIGNIFICANT CHANGE UP (ref 6.6–8.7)
PROTHROM AB SERPL-ACNC: 13.1 SEC — SIGNIFICANT CHANGE UP (ref 10.6–13.6)
SARS-COV-2 RNA SPEC QL NAA+PROBE: SIGNIFICANT CHANGE UP
SODIUM SERPL-SCNC: 137 MMOL/L — SIGNIFICANT CHANGE UP (ref 135–145)

## 2021-01-09 PROCEDURE — 71045 X-RAY EXAM CHEST 1 VIEW: CPT | Mod: 26

## 2021-01-09 PROCEDURE — 49507 PRP I/HERN INIT BLOCK >5 YR: CPT

## 2021-01-09 PROCEDURE — 93010 ELECTROCARDIOGRAM REPORT: CPT

## 2021-01-09 PROCEDURE — 99222 1ST HOSP IP/OBS MODERATE 55: CPT

## 2021-01-09 RX ORDER — ACETAMINOPHEN 500 MG
975 TABLET ORAL EVERY 6 HOURS
Refills: 0 | Status: DISCONTINUED | OUTPATIENT
Start: 2021-01-09 | End: 2021-01-09

## 2021-01-09 RX ORDER — IBUPROFEN 200 MG
600 TABLET ORAL EVERY 6 HOURS
Refills: 0 | Status: DISCONTINUED | OUTPATIENT
Start: 2021-01-09 | End: 2021-01-09

## 2021-01-09 RX ORDER — LANOLIN ALCOHOL/MO/W.PET/CERES
5 CREAM (GRAM) TOPICAL AT BEDTIME
Refills: 0 | Status: DISCONTINUED | OUTPATIENT
Start: 2021-01-09 | End: 2021-01-09

## 2021-01-09 RX ORDER — SODIUM CHLORIDE 9 MG/ML
1000 INJECTION, SOLUTION INTRAVENOUS
Refills: 0 | Status: DISCONTINUED | OUTPATIENT
Start: 2021-01-09 | End: 2021-01-09

## 2021-01-09 RX ORDER — ONDANSETRON 8 MG/1
4 TABLET, FILM COATED ORAL EVERY 6 HOURS
Refills: 0 | Status: DISCONTINUED | OUTPATIENT
Start: 2021-01-09 | End: 2021-01-09

## 2021-01-09 RX ORDER — ACETAMINOPHEN 500 MG
3 TABLET ORAL
Qty: 0 | Refills: 0 | DISCHARGE
Start: 2021-01-09

## 2021-01-09 RX ORDER — TRAMADOL HYDROCHLORIDE 50 MG/1
25 TABLET ORAL EVERY 6 HOURS
Refills: 0 | Status: DISCONTINUED | OUTPATIENT
Start: 2021-01-09 | End: 2021-01-09

## 2021-01-09 RX ORDER — FENTANYL CITRATE 50 UG/ML
50 INJECTION INTRAVENOUS
Refills: 0 | Status: DISCONTINUED | OUTPATIENT
Start: 2021-01-09 | End: 2021-01-09

## 2021-01-09 RX ORDER — ENOXAPARIN SODIUM 100 MG/ML
40 INJECTION SUBCUTANEOUS AT BEDTIME
Refills: 0 | Status: DISCONTINUED | OUTPATIENT
Start: 2021-01-09 | End: 2021-01-09

## 2021-01-09 RX ORDER — IBUPROFEN 200 MG
1 TABLET ORAL
Qty: 0 | Refills: 0 | DISCHARGE
Start: 2021-01-09

## 2021-01-09 RX ORDER — TRAMADOL HYDROCHLORIDE 50 MG/1
50 TABLET ORAL EVERY 6 HOURS
Refills: 0 | Status: DISCONTINUED | OUTPATIENT
Start: 2021-01-09 | End: 2021-01-09

## 2021-01-09 RX ORDER — ONDANSETRON 8 MG/1
4 TABLET, FILM COATED ORAL ONCE
Refills: 0 | Status: DISCONTINUED | OUTPATIENT
Start: 2021-01-09 | End: 2021-01-09

## 2021-01-09 RX ORDER — TRAMADOL HYDROCHLORIDE 50 MG/1
1 TABLET ORAL
Qty: 0 | Refills: 0 | DISCHARGE
Start: 2021-01-09

## 2021-01-09 RX ORDER — TRAMADOL HYDROCHLORIDE 50 MG/1
1 TABLET ORAL
Qty: 12 | Refills: 0
Start: 2021-01-09 | End: 2021-01-11

## 2021-01-09 RX ADMIN — SODIUM CHLORIDE 125 MILLILITER(S): 9 INJECTION, SOLUTION INTRAVENOUS at 02:15

## 2021-01-09 RX ADMIN — Medication 975 MILLIGRAM(S): at 17:21

## 2021-01-09 RX ADMIN — Medication 975 MILLIGRAM(S): at 05:40

## 2021-01-09 RX ADMIN — TRAMADOL HYDROCHLORIDE 25 MILLIGRAM(S): 50 TABLET ORAL at 16:28

## 2021-01-09 RX ADMIN — FENTANYL CITRATE 50 MICROGRAM(S): 50 INJECTION INTRAVENOUS at 15:29

## 2021-01-09 RX ADMIN — FENTANYL CITRATE 50 MICROGRAM(S): 50 INJECTION INTRAVENOUS at 16:00

## 2021-01-09 NOTE — BRIEF OPERATIVE NOTE - NSICDXBRIEFPROCEDURE_GEN_ALL_CORE_FT
PROCEDURES:  Repair, hernia, inguinal, open, using mesh, adult 10-Morgan-2021 07:08:42 Right Elier Burgos

## 2021-01-09 NOTE — BRIEF OPERATIVE NOTE - OPERATION/FINDINGS
Large right sided inguinal hernia containing small bowel and bladder  No pelvic floor  Plug and patch repair performed

## 2021-01-09 NOTE — DISCHARGE NOTE PROVIDER - HOSPITAL COURSE
66yo M with no significant PMH presented to ED 1/9 with large right inguinal hernia and abdominal pain. Patient has noted a right inguinal hernia for the past two years, which has progressively gotten larger. Patient came to the ED on 1/6 presenting with the same symptoms and was instructed to follow outpatient for an elective procedure. Patient continues to have bowel function denies fevers, chills, chest pain, SOB, n/v diarrhea or constipation nor generalized malaise. Patient's occupation requires him to frequently lift heavy objects.  Pt taken to OR w/ Dr. Yancey on 1/9 for right inguinal hernia repair with mesh. Pt tolerated procedure well, post operatively was tolerating a regular diet, voiding spontaneously and ambulating. Stable for discharge home.

## 2021-01-09 NOTE — DISCHARGE NOTE PROVIDER - CARE PROVIDER_API CALL
Truman Yancey (DO)  Surgery; Surgical Critical Care  [address], [address] [place] [address]  Phone: [phone]  Fax: [phone]  Follow Up Time: 2 weeks

## 2021-01-09 NOTE — H&P ADULT - NSHPPHYSICALEXAM_GEN_ALL_CORE
GENERAL: Alert, well developed, in no acute distress.  MENTAL STATUS: AAOx3. Appropriate affect.  HEENT: PERRLA. EOMI. MMM.  Trachea midline.   RESPIRATORY: no labored breathing or conversational dyspnea  CARDIOVASCULAR: RRR.   GASTROINTESTINAL: Abdomen non-distended, soft and depressible, tender to palpation in RLQ, large inguinal hernia extending to scrotum, readily reducible.   MUSCULOSKELETAL: No cyanosis or clubbing. No gross deformities.

## 2021-01-09 NOTE — H&P ADULT - HISTORY OF PRESENT ILLNESS
Patient is a 66yo M with no significant PMH presenting with large right inguinal hernia and abdominal pain. Patient reports he has this RIH since 2 years, and over the year it was gotten bigger. Patient came to the ED on 1/6 presenting with the same symptoms and was instructed to follow outpatient for an elective procedure. Patient continues to have bowel function denies fevers, chills, chest pain, SOB, n/v diarrhea or constipation nor generalized malaise. Patient work in recycling and consist on heavy lifting.

## 2021-01-09 NOTE — DISCHARGE NOTE NURSING/CASE MANAGEMENT/SOCIAL WORK - PATIENT PORTAL LINK FT
You can access the FollowMyHealth Patient Portal offered by [facility] by registering at the following website: [email or web address]. By joining [employer]’s FollowMyHealth portal, you will also be able to view your health information using other applications (apps) compatible with our system.

## 2021-01-09 NOTE — DISCHARGE NOTE PROVIDER - NSDCCPCAREPLAN_GEN_ALL_CORE_FT
PRINCIPAL DISCHARGE DIAGNOSIS  Diagnosis: Inguinal hernia  Assessment and Plan of Treatment: .Follow up: Please call and make an appointment with the [facility] 10-14 days after discharge. Also, please call and make an appointment with your primary care physician as per your usual schedule.   Activity: May return to normal activities as tolerated, however refrain from heavy lifting > 10-15 lbs.  Diet: May continue regular diet.  Medications: Please take all medications listed on your discharge paperwork as prescribed. Pain medication has been prescribed for you. Please, take it as it has been prescribed, do not drive or operate heavy machinery while taking narcotics.  You are encouraged to take over-the-counter tylenol and/or ibuprofen for pain relief when you feel your pain no longer warrants the use of narcotic pain medications, however DO NOT TAKE percocet and tylenol at the same time as they contain the same active ingredient (acetaminophen). Take only percocet OR tylenol.  Wound Care: Please, keep wound site clean and dry. You may shower, but do not bathe.  Patient is advised to RETURN TO THE EMERGENCY DEPARTMENT for any of the following - worsening pain, fever/chills, nausea/vomiting, altered mental status, chest pain, shortness of breath, or any other new / worsening symptom.

## 2021-01-09 NOTE — H&P ADULT - ASSESSMENT
Patient is a 64yo M with no significant PMH presenting for the second time to the ED with significant pain in large right inguinal hernia reducible at bedside    -Admit [facility], Dr. Almaguer  -OR on this admission  -NPO/IVF  -Pain control  -Zofran for nausea  -DVTPPX and SCD  -COVID  -Preop labs, EKG and CXR

## 2021-01-09 NOTE — DISCHARGE NOTE PROVIDER - NSDCMRMEDTOKEN_GEN_ALL_CORE_FT
acetaminophen 325 mg oral tablet: 3 tab(s) orally every 6 hours  ibuprofen 600 mg oral tablet: 1 tab(s) orally every 6 hours, As needed, Mild Pain (1 - 3)  traMADol 50 mg oral tablet: 1 tab(s) orally every 6 hours, As needed, Severe Pain (7 - 10)

## 2021-01-09 NOTE — H&P ADULT - NSHPLABSRESULTS_GEN_ALL_CORE
LABS:                        14.8   10.39 )-----------( 232      ( 08 Jan 2021 23:49 )             44.1     01-08    137  |  103  |  12.0  ----------------------------<  104<H>  4.6   |  23.0  |  0.54    Ca    9.4      08 Jan 2021 23:49    TPro  7.6  /  Alb  4.4  /  TBili  0.2<L>  /  DBili  x   /  AST  22  /  ALT  16  /  AlkPhos  124<H>  01-08    Lactate: 01-08 @ 23:49  1.1        IMAGING:  CT Abdomen and Pelvis w/ IV Cont (01.06.21 @ 02:03)      Mildly prominent fecalized distal jejunal and ileal loops, concerning for ileus vs at least partial small bowel obstruction.  Transition point suspected at the level of the right groin hernia containing portion of the distal jejunal fold with trace mesenteric haziness. Recommend clinical correlation to assess for strangulated or incarcerated hernia though no pneumatosis or portal venous gas identified. Correlate with lactic acid.    Portion of the bladder also herniates within the right groin hernia extending to the scrotal sac.

## 2021-01-10 LAB
SARS-COV-2 IGG SERPL QL IA: POSITIVE
SARS-COV-2 IGM SERPL IA-ACNC: 1.58 INDEX — HIGH

## 2021-01-19 ENCOUNTER — APPOINTMENT (OUTPATIENT)
Dept: TRAUMA SURGERY | Facility: CLINIC | Age: 66
End: 2021-01-19
Payer: SELF-PAY

## 2021-01-19 VITALS
DIASTOLIC BLOOD PRESSURE: 83 MMHG | SYSTOLIC BLOOD PRESSURE: 151 MMHG | WEIGHT: 167 LBS | OXYGEN SATURATION: 98 % | BODY MASS INDEX: 29.59 KG/M2 | TEMPERATURE: 98 F | HEIGHT: 63 IN | HEART RATE: 73 BPM

## 2021-01-19 DIAGNOSIS — K40.90 UNILATERAL INGUINAL HERNIA, W/OUT OBSTRUCTION OR GANGRENE, NOT SPECIFIED AS RECURRENT: ICD-10-CM

## 2021-01-19 DIAGNOSIS — Z78.9 OTHER SPECIFIED HEALTH STATUS: ICD-10-CM

## 2021-01-19 DIAGNOSIS — K46.9 UNSPECIFIED ABDOMINAL HERNIA W/OUT OBSTRUCTION OR GANGRENE: ICD-10-CM

## 2021-01-19 PROCEDURE — 99024 POSTOP FOLLOW-UP VISIT: CPT

## 2021-01-20 PROBLEM — K40.90 HERNIA, INGUINAL, RIGHT: Status: ACTIVE | Noted: 2021-01-19

## 2021-01-20 NOTE — REVIEW OF SYSTEMS
[Dysuria] : no dysuria [Incontinence] : no incontinence [Hesitancy] : no urinary hesitancy [Nocturia] : no nocturia [Testicular Pain] : testicular pain [Negative] : Psychiatric [FreeTextEntry8] : testicular pain post  surgery

## 2021-01-20 NOTE — HISTORY OF PRESENT ILLNESS
[FreeTextEntry1] : Patient presents  to [facility]  clinic  for  post op exam  due  to  repair  of incarcerated right inguinal hernia scrotal hernia   Patient  at  time  of this  exam  states   testicle still hurt    denies  any  trauma     Patient  denies  any  fever  no  chills  no n/v/d  nl bm nl urination   Entire  exam   with  aid of

## 2021-01-20 NOTE — VITALS
[Aching] : aching [Tender] : tender [de-identified] : testicle [FreeTextEntry3] : testicles [FreeTextEntry1] : pt unsure [FreeTextEntry2] : pt unsure

## 2021-01-20 NOTE — ASSESSMENT
[FreeTextEntry1] : RTC after  sonogram of testicles\par any acute  symptoms  and  or  changes  call back [facility] or  go  directly to [facility]

## 2021-01-20 NOTE — PHYSICAL EXAM
[Normal Breath Sounds] : Normal breath sounds [Normal Heart Sounds] : normal heart sounds [No Rash or Lesion] : No rash or lesion [Alert] : alert [Oriented to Person] : oriented to person [Oriented to Place] : oriented to place [Oriented to Time] : oriented to time [Calm] : calm [de-identified] : wdwn  male  in  nad [de-identified] : Non icteric jnug arias [de-identified] : trachea  midline [de-identified] : right  inguinal incision site with  steri strips no signs  of  infection  no  seroma  formation   slight  tenderness  to palpation     tescticles  swollen  penis  shaft not appreciated    tip of urethra visible   skin intact    no palpable masses  testicles  tender to palpation [de-identified] : soft no  guarding

## 2021-01-21 ENCOUNTER — OUTPATIENT (OUTPATIENT)
Dept: OUTPATIENT SERVICES | Facility: HOSPITAL | Age: 66
LOS: 1 days | End: 2021-01-21
Payer: SELF-PAY

## 2021-01-21 ENCOUNTER — APPOINTMENT (OUTPATIENT)
Dept: ULTRASOUND IMAGING | Facility: CLINIC | Age: 66
End: 2021-01-21
Payer: MEDICAID

## 2021-01-21 DIAGNOSIS — K40.90 UNILATERAL INGUINAL HERNIA, WITHOUT OBSTRUCTION OR GANGRENE, NOT SPECIFIED AS RECURRENT: ICD-10-CM

## 2021-01-21 PROCEDURE — 93975 VASCULAR STUDY: CPT

## 2021-01-21 PROCEDURE — 93975 VASCULAR STUDY: CPT | Mod: 26

## 2021-01-26 ENCOUNTER — APPOINTMENT (OUTPATIENT)
Dept: TRAUMA SURGERY | Facility: CLINIC | Age: 66
End: 2021-01-26
Payer: SELF-PAY

## 2021-01-26 VITALS
HEIGHT: 63 IN | DIASTOLIC BLOOD PRESSURE: 85 MMHG | OXYGEN SATURATION: 98 % | RESPIRATION RATE: 16 BRPM | WEIGHT: 170.03 LBS | SYSTOLIC BLOOD PRESSURE: 121 MMHG | TEMPERATURE: 97.2 F | BODY MASS INDEX: 30.12 KG/M2 | HEART RATE: 70 BPM

## 2021-01-26 PROCEDURE — 99024 POSTOP FOLLOW-UP VISIT: CPT

## 2021-01-26 NOTE — HISTORY OF PRESENT ILLNESS
[FreeTextEntry1] : S/P RIH repair\par Now has recurrence\par The patient also has a asymptomatic LIH\par No complaints now\par Tolerating his diet\par

## 2021-01-26 NOTE — ASSESSMENT
[FreeTextEntry1] : RIH hernia recurrence and asymptomatic LIH\par Options for repair include laparoscopic herniorrhaphy or preperitoneal open (bhavin-stoppa) repair\par Will refer to Dr. Haas for evaluation.

## 2021-02-11 PROCEDURE — 85610 PROTHROMBIN TIME: CPT

## 2021-02-11 PROCEDURE — 86769 SARS-COV-2 COVID-19 ANTIBODY: CPT

## 2021-02-11 PROCEDURE — 80053 COMPREHEN METABOLIC PANEL: CPT

## 2021-02-11 PROCEDURE — 99285 EMERGENCY DEPT VISIT HI MDM: CPT | Mod: 25

## 2021-02-11 PROCEDURE — U0003: CPT

## 2021-02-11 PROCEDURE — 96374 THER/PROPH/DIAG INJ IV PUSH: CPT

## 2021-02-11 PROCEDURE — 86901 BLOOD TYPING SEROLOGIC RH(D): CPT

## 2021-02-11 PROCEDURE — U0005: CPT

## 2021-02-11 PROCEDURE — 81001 URINALYSIS AUTO W/SCOPE: CPT

## 2021-02-11 PROCEDURE — C1781: CPT

## 2021-02-11 PROCEDURE — 82962 GLUCOSE BLOOD TEST: CPT

## 2021-02-11 PROCEDURE — 86900 BLOOD TYPING SEROLOGIC ABO: CPT

## 2021-02-11 PROCEDURE — 71045 X-RAY EXAM CHEST 1 VIEW: CPT

## 2021-02-11 PROCEDURE — 85025 COMPLETE CBC W/AUTO DIFF WBC: CPT

## 2021-02-11 PROCEDURE — 36415 COLL VENOUS BLD VENIPUNCTURE: CPT

## 2021-02-11 PROCEDURE — 85730 THROMBOPLASTIN TIME PARTIAL: CPT

## 2021-02-11 PROCEDURE — 93005 ELECTROCARDIOGRAM TRACING: CPT

## 2021-02-11 PROCEDURE — 86850 RBC ANTIBODY SCREEN: CPT

## 2021-02-11 PROCEDURE — 83605 ASSAY OF LACTIC ACID: CPT

## 2021-02-11 PROCEDURE — 74177 CT ABD & PELVIS W/CONTRAST: CPT

## 2021-02-22 ENCOUNTER — APPOINTMENT (OUTPATIENT)
Dept: SURGERY | Facility: CLINIC | Age: 66
End: 2021-02-22

## 2022-04-21 NOTE — ED ADULT NURSE NOTE - CHIEF COMPLAINT
No acute events overnight. Pt with no complaints.
The patient is a 65y Male complaining of urinary symptoms.

## 2023-02-10 NOTE — CONSULT NOTE ADULT - PROVIDER SPECIALTY LIST ADULT
Render In Strict Bullet Format?: No
Initiate Treatment: clobetasol 0.05 % scalp solution-Apply to the affected area on the scalp for itch and irritation\\n\\ndoxycycline hyclate 20 mg tablet BID-Take one tablet PO twice daily, every morning and night with food
Detail Level: Zone
Surgery

## 2023-11-17 NOTE — ED PROVIDER NOTE - SKIN NEGATIVE STATEMENT, MLM
no abrasions, no jaundice, no lesions, no pruritis, and no rashes. V-Y Flap Text: The defect edges were debeveled with a #15 scalpel blade. Given the location of the defect, shape of the defect and the proximity to free margins a V-Y flap was deemed most appropriate. Using a sterile surgical marker, an appropriate advancement flap was drawn incorporating the defect and placing the expected incisions within the relaxed skin tension lines where possible. The area thus outlined was incised deep to adipose tissue with a #15 scalpel blade. The skin margins were undermined to an appropriate distance in all directions utilizing iris scissors. Following this, the designed flap was advanced and carried over into the primary defect and sutured into place.

## 2025-02-19 NOTE — PATIENT PROFILE ADULT - LANGUAGE ASSISTANCE NEEDED
Maternal Screening Survey      Flowsheet Row Responses   [facility]   [facility]? Yes   Call start time 1100   Call end time 1112   I have been able to laugh and see the funny side of things. 0   I have looked forward with enjoyment to things. 1   I have blamed myself unnecessarily when things went wrong. 3   I have been anxious or worried for no good reason. 3   I have felt scared or panicky for no good reason. 3   Things have been getting on top of me. 0   I have been so unhappy that I have had difficulty sleeping. 0   I have felt sad or miserable. 2   I have been so unhappy that I have been crying. 2   The thought of harming myself has occurred to me. 0   [place]  Depression Scale Total 14   OB Provider Notification Secure Message   Did any of your parents have problems with alcohol or drug use? No   Do any of your peers have problems with alcohol or drug use? No   Does your partner have problems with alcohol or drug use? No   Before you were pregnant did you have problems with alcohol or drug use? (past) No   In the past month, did you drink beer, wine, liquor or use any other drugs? (pregnancy) No   Maternal Screening call completed Yes   Wrap up additional comments Alerted office and send MD a message.   Call end time 1112              Leatha MOBLEY - Registered Nurse           Yes-Patient/Caregiver accepts free interpretation services...